# Patient Record
Sex: MALE | Race: WHITE
[De-identification: names, ages, dates, MRNs, and addresses within clinical notes are randomized per-mention and may not be internally consistent; named-entity substitution may affect disease eponyms.]

---

## 2017-11-21 ENCOUNTER — HOSPITAL ENCOUNTER (EMERGENCY)
Dept: HOSPITAL 62 - ER | Age: 27
Discharge: HOME | End: 2017-11-21
Payer: SELF-PAY

## 2017-11-21 VITALS — DIASTOLIC BLOOD PRESSURE: 107 MMHG | SYSTOLIC BLOOD PRESSURE: 144 MMHG

## 2017-11-21 DIAGNOSIS — E86.0: ICD-10-CM

## 2017-11-21 DIAGNOSIS — F17.200: ICD-10-CM

## 2017-11-21 DIAGNOSIS — R51: ICD-10-CM

## 2017-11-21 DIAGNOSIS — R53.1: ICD-10-CM

## 2017-11-21 DIAGNOSIS — F10.10: Primary | ICD-10-CM

## 2017-11-21 LAB
ALBUMIN SERPL-MCNC: 4.9 G/DL (ref 3.5–5)
ALP SERPL-CCNC: 75 U/L (ref 38–126)
ALT SERPL-CCNC: 40 U/L (ref 21–72)
ANION GAP SERPL CALC-SCNC: 18 MMOL/L (ref 5–19)
APPEARANCE UR: (no result)
AST SERPL-CCNC: 39 U/L (ref 17–59)
BARBITURATES UR QL SCN: (no result)
BASOPHILS # BLD AUTO: 0 10^3/UL (ref 0–0.2)
BASOPHILS NFR BLD AUTO: 0.4 % (ref 0–2)
BILIRUB DIRECT SERPL-MCNC: 0.5 MG/DL (ref 0–0.4)
BILIRUB SERPL-MCNC: 1.1 MG/DL (ref 0.2–1.3)
BILIRUB UR QL STRIP: NEGATIVE
BUN SERPL-MCNC: 12 MG/DL (ref 7–20)
CALCIUM: 9.8 MG/DL (ref 8.4–10.2)
CHLORIDE SERPL-SCNC: 102 MMOL/L (ref 98–107)
CO2 SERPL-SCNC: 25 MMOL/L (ref 22–30)
CREAT SERPL-MCNC: 1 MG/DL (ref 0.52–1.25)
EOSINOPHIL # BLD AUTO: 0 10^3/UL (ref 0–0.6)
EOSINOPHIL NFR BLD AUTO: 0.1 % (ref 0–6)
ERYTHROCYTE [DISTWIDTH] IN BLOOD BY AUTOMATED COUNT: 12.1 % (ref 11.5–14)
ETHANOL SERPL-MCNC: 33 MG/DL
GLUCOSE SERPL-MCNC: 109 MG/DL (ref 75–110)
GLUCOSE UR STRIP-MCNC: NEGATIVE MG/DL
HCT VFR BLD CALC: 52.9 % (ref 37.9–51)
HGB BLD-MCNC: 18.8 G/DL (ref 13.5–17)
HGB HCT DIFFERENCE: 3.5
KETONES UR STRIP-MCNC: (no result) MG/DL
LIPASE SERPL-CCNC: 236.9 U/L (ref 23–300)
LYMPHOCYTES # BLD AUTO: 1.7 10^3/UL (ref 0.5–4.7)
LYMPHOCYTES NFR BLD AUTO: 18.6 % (ref 13–45)
MCH RBC QN AUTO: 34.3 PG (ref 27–33.4)
MCHC RBC AUTO-ENTMCNC: 35.6 G/DL (ref 32–36)
MCV RBC AUTO: 96 FL (ref 80–97)
METHADONE UR QL SCN: NEGATIVE
MONOCYTES # BLD AUTO: 0.7 10^3/UL (ref 0.1–1.4)
MONOCYTES NFR BLD AUTO: 7.2 % (ref 3–13)
NEUTROPHILS # BLD AUTO: 6.9 10^3/UL (ref 1.7–8.2)
NEUTS SEG NFR BLD AUTO: 73.7 % (ref 42–78)
NITRITE UR QL STRIP: NEGATIVE
PCP UR QL SCN: NEGATIVE
PH UR STRIP: 5 [PH] (ref 5–9)
POTASSIUM SERPL-SCNC: 4.1 MMOL/L (ref 3.6–5)
PROT SERPL-MCNC: 8.2 G/DL (ref 6.3–8.2)
PROT UR STRIP-MCNC: 100 MG/DL
RBC # BLD AUTO: 5.5 10^6/UL (ref 4.35–5.55)
SODIUM SERPL-SCNC: 145.1 MMOL/L (ref 137–145)
SP GR UR STRIP: 1.03
URINE OPIATES LOW: NEGATIVE
UROBILINOGEN UR-MCNC: 2 MG/DL (ref ?–2)
WBC # BLD AUTO: 9.3 10^3/UL (ref 4–10.5)

## 2017-11-21 PROCEDURE — 80053 COMPREHEN METABOLIC PANEL: CPT

## 2017-11-21 PROCEDURE — 96365 THER/PROPH/DIAG IV INF INIT: CPT

## 2017-11-21 PROCEDURE — 83690 ASSAY OF LIPASE: CPT

## 2017-11-21 PROCEDURE — 81001 URINALYSIS AUTO W/SCOPE: CPT

## 2017-11-21 PROCEDURE — 80307 DRUG TEST PRSMV CHEM ANLYZR: CPT

## 2017-11-21 PROCEDURE — 36415 COLL VENOUS BLD VENIPUNCTURE: CPT

## 2017-11-21 PROCEDURE — 85025 COMPLETE CBC W/AUTO DIFF WBC: CPT

## 2017-11-21 PROCEDURE — 96375 TX/PRO/DX INJ NEW DRUG ADDON: CPT

## 2017-11-21 PROCEDURE — 99284 EMERGENCY DEPT VISIT MOD MDM: CPT

## 2017-11-21 PROCEDURE — 96361 HYDRATE IV INFUSION ADD-ON: CPT

## 2017-11-21 PROCEDURE — 82962 GLUCOSE BLOOD TEST: CPT
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
## 2017-11-21 NOTE — ER DOCUMENT REPORT
ED Medical Screen (RME)





- General


Chief Complaint: ETOH Abuse


Stated Complaint: WEAKNESS


Time Seen by Provider: 11/21/17 14:29


Notes: 





Patient states he has been drinking for 3-4 days straight and feels dehydrated.

  States he has vomiting and diffuse abdominal pain.


TRAVEL OUTSIDE OF THE [place] IN LAST 30 DAYS: No





- Related Data


Allergies/Adverse Reactions: 


 





erythromycin ethylsuccinate [From Pediazole] Allergy (Mild, Verified 11/21/17 14

:10)


 


sulfisoxazole acetyl [From Pediazole] Allergy (Mild, Verified 11/21/17 14:10)


 











Past Medical History





- Social History


Frequency of alcohol use: Heavy


Drug Abuse: None





- Past Medical History


Cardiac Medical History: Reports: Hx Hypertension


Renal/ Medical History: Denies: Hx Peritoneal Dialysis


Past Surgical History: Reports: Hx Myringotomy





- Immunizations


Hx Diphtheria, Pertussis, Tetanus Vaccination: Yes





Physical Exam





- Vital signs


Vitals: 





 











Temp Pulse Resp BP Pulse Ox


 


 98.8 F   123 H  17   144/107 H  98 


 


 11/21/17 14:10  11/21/17 14:10  11/21/17 14:10  11/21/17 14:10  11/21/17 14:10














Course





- Vital Signs


Vital signs: 





 











Temp Pulse Resp BP Pulse Ox


 


 98.8 F   123 H  17   144/107 H  98 


 


 11/21/17 14:10  11/21/17 14:10  11/21/17 14:10  11/21/17 14:10  11/21/17 14:10

## 2018-02-15 ENCOUNTER — HOSPITAL ENCOUNTER (EMERGENCY)
Dept: HOSPITAL 62 - ER | Age: 28
Discharge: HOME | End: 2018-02-15
Payer: COMMERCIAL

## 2018-02-15 VITALS — DIASTOLIC BLOOD PRESSURE: 86 MMHG | SYSTOLIC BLOOD PRESSURE: 136 MMHG

## 2018-02-15 DIAGNOSIS — S80.11XA: ICD-10-CM
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
## 2018-02-15 DIAGNOSIS — S40.021A: ICD-10-CM

## 2018-02-15 DIAGNOSIS — F17.200: ICD-10-CM

## 2018-02-15 DIAGNOSIS — F19.10: ICD-10-CM

## 2018-02-15 DIAGNOSIS — S40.022A: ICD-10-CM

## 2018-02-15 DIAGNOSIS — T59.3X3A: Primary | ICD-10-CM

## 2018-02-15 DIAGNOSIS — R00.0: ICD-10-CM
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
## 2018-02-15 DIAGNOSIS — Z88.1: ICD-10-CM

## 2018-02-15 DIAGNOSIS — I10: ICD-10-CM

## 2018-02-15 DIAGNOSIS — F41.9: ICD-10-CM

## 2018-02-15 PROCEDURE — 99285 EMERGENCY DEPT VISIT HI MDM: CPT
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
## 2018-02-18 ENCOUNTER — HOSPITAL ENCOUNTER (EMERGENCY)
Dept: HOSPITAL 62 - ER | Age: 28
Discharge: HOME | End: 2018-02-18
Payer: SELF-PAY

## 2018-02-18 VITALS — SYSTOLIC BLOOD PRESSURE: 142 MMHG | DIASTOLIC BLOOD PRESSURE: 88 MMHG

## 2018-02-18 DIAGNOSIS — F10.10: ICD-10-CM

## 2018-02-18 DIAGNOSIS — S39.92XA: ICD-10-CM

## 2018-02-18 DIAGNOSIS — S92.415A: Primary | ICD-10-CM

## 2018-02-18 DIAGNOSIS — I10: ICD-10-CM

## 2018-02-18 DIAGNOSIS — M79.641: ICD-10-CM

## 2018-02-18 DIAGNOSIS — M54.9: ICD-10-CM

## 2018-02-18 DIAGNOSIS — R00.0: ICD-10-CM

## 2018-02-18 DIAGNOSIS — R06.82: ICD-10-CM

## 2018-02-18 DIAGNOSIS — Z88.1: ICD-10-CM
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
## 2018-02-18 DIAGNOSIS — M79.672: ICD-10-CM
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
## 2018-02-18 PROCEDURE — 72125 CT NECK SPINE W/O DYE: CPT

## 2018-02-18 PROCEDURE — 99284 EMERGENCY DEPT VISIT MOD MDM: CPT

## 2018-02-18 PROCEDURE — 72131 CT LUMBAR SPINE W/O DYE: CPT

## 2018-02-18 PROCEDURE — 72128 CT CHEST SPINE W/O DYE: CPT
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
## 2018-10-23 ENCOUNTER — HOSPITAL ENCOUNTER (EMERGENCY)
Dept: HOSPITAL 62 - ER | Age: 28
Discharge: HOME | End: 2018-10-23
Payer: SELF-PAY

## 2018-10-23 VITALS — DIASTOLIC BLOOD PRESSURE: 94 MMHG | SYSTOLIC BLOOD PRESSURE: 136 MMHG

## 2018-10-23 DIAGNOSIS — S22.42XA: Primary | ICD-10-CM
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
## 2018-10-23 DIAGNOSIS — Z88.1: ICD-10-CM

## 2018-10-23 DIAGNOSIS — S50.812A: ICD-10-CM

## 2018-10-23 DIAGNOSIS — R07.81: ICD-10-CM
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
## 2018-10-23 DIAGNOSIS — I10: ICD-10-CM

## 2018-10-23 PROCEDURE — 99283 EMERGENCY DEPT VISIT LOW MDM: CPT
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
## 2019-08-13 ENCOUNTER — HOSPITAL ENCOUNTER (EMERGENCY)
Dept: HOSPITAL 62 - ER | Age: 29
LOS: 1 days | Discharge: LEFT BEFORE DISCHARGE | End: 2019-08-14
Payer: SELF-PAY

## 2019-08-13 DIAGNOSIS — Z53.21: Primary | ICD-10-CM

## 2019-08-14 ENCOUNTER — HOSPITAL ENCOUNTER (EMERGENCY)
Dept: HOSPITAL 62 - ER | Age: 29
Discharge: HOME | End: 2019-08-14
Payer: COMMERCIAL

## 2019-08-14 VITALS — SYSTOLIC BLOOD PRESSURE: 172 MMHG | DIASTOLIC BLOOD PRESSURE: 110 MMHG

## 2019-08-14 DIAGNOSIS — I10: ICD-10-CM

## 2019-08-14 DIAGNOSIS — S02.31XB: Primary | ICD-10-CM

## 2019-08-14 DIAGNOSIS — R07.9: ICD-10-CM

## 2019-08-14 DIAGNOSIS — S09.93XA: ICD-10-CM

## 2019-08-14 DIAGNOSIS — F17.200: ICD-10-CM

## 2019-08-14 DIAGNOSIS — M25.551: ICD-10-CM
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
## 2019-08-14 DIAGNOSIS — H53.8: ICD-10-CM

## 2019-08-14 PROCEDURE — A6266 IMPREG GAUZE NO H20/SAL/YARD: HCPCS

## 2019-08-14 PROCEDURE — 70486 CT MAXILLOFACIAL W/O DYE: CPT

## 2019-08-14 PROCEDURE — 73060 X-RAY EXAM OF HUMERUS: CPT

## 2019-08-14 PROCEDURE — 72125 CT NECK SPINE W/O DYE: CPT

## 2019-08-14 PROCEDURE — 96372 THER/PROPH/DIAG INJ SC/IM: CPT

## 2019-08-14 PROCEDURE — 71250 CT THORAX DX C-: CPT

## 2019-08-14 PROCEDURE — 99284 EMERGENCY DEPT VISIT MOD MDM: CPT

## 2019-08-14 PROCEDURE — 70450 CT HEAD/BRAIN W/O DYE: CPT
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
